# Patient Record
Sex: MALE | Race: WHITE | NOT HISPANIC OR LATINO | Employment: FULL TIME | URBAN - METROPOLITAN AREA
[De-identification: names, ages, dates, MRNs, and addresses within clinical notes are randomized per-mention and may not be internally consistent; named-entity substitution may affect disease eponyms.]

---

## 2019-02-01 ENCOUNTER — HOSPITAL ENCOUNTER (EMERGENCY)
Facility: HOSPITAL | Age: 34
Discharge: HOME | End: 2019-02-01
Attending: EMERGENCY MEDICINE
Payer: MEDICARE

## 2019-02-01 VITALS
TEMPERATURE: 98 F | HEIGHT: 70 IN | SYSTOLIC BLOOD PRESSURE: 155 MMHG | WEIGHT: 294.56 LBS | RESPIRATION RATE: 16 BRPM | DIASTOLIC BLOOD PRESSURE: 101 MMHG | BODY MASS INDEX: 42.17 KG/M2 | OXYGEN SATURATION: 96 % | HEART RATE: 73 BPM

## 2019-02-01 DIAGNOSIS — R03.0 ELEVATED BLOOD PRESSURE READING: ICD-10-CM

## 2019-02-01 DIAGNOSIS — H66.92 LEFT OTITIS MEDIA, UNSPECIFIED OTITIS MEDIA TYPE: Primary | ICD-10-CM

## 2019-02-01 DIAGNOSIS — K08.89 TOOTHACHE: ICD-10-CM

## 2019-02-01 PROCEDURE — 99284 EMERGENCY DEPT VISIT MOD MDM: CPT

## 2019-02-01 RX ORDER — IBUPROFEN 800 MG/1
800 TABLET ORAL EVERY 6 HOURS PRN
Qty: 20 TABLET | Refills: 0 | Status: SHIPPED | OUTPATIENT
Start: 2019-02-01

## 2019-02-01 RX ORDER — AMOXICILLIN AND CLAVULANATE POTASSIUM 875; 125 MG/1; MG/1
1 TABLET, FILM COATED ORAL EVERY 12 HOURS
Qty: 14 TABLET | Refills: 0 | Status: SHIPPED | OUTPATIENT
Start: 2019-02-01

## 2019-02-02 NOTE — ED PROVIDER NOTES
HISTORY     Chief Complaint   Patient presents with    Otalgia    Sore Throat    Dental Pain     Review of patient's allergies indicates:   Allergen Reactions    Inapsine [droperidol]         HPI   The history is provided by the patient.   Otalgia   This is a new problem. The current episode started today. There is pain in the left ear. The problem has been unchanged. The pain is at a severity of 7/10. Associated symptoms include sore throat. Pertinent negatives include no diarrhea, no vomiting, no cough and no rash.   Sore Throat   This is a new problem. The problem has not changed since onset.Pertinent negatives include no chest pain and no shortness of breath.   Dental Pain   The primary symptoms include mouth pain and sore throat. Primary symptoms do not include fever, shortness of breath or cough. The symptoms began several days ago. The symptoms are waxing and waning.   Additional symptoms include: ear pain.        PCP: Jose Giraldo MD     Past Medical History:  Past Medical History:   Diagnosis Date    Colon cancer     Ulcerative colitis         Past Surgical History:  Past Surgical History:   Procedure Laterality Date    ANKLE FRACTURE SURGERY      COLON SURGERY      FOOT SURGERY      GLOSSECTOMY-PARTIAL N/A 12/30/2013    Performed by [facility] at [facility]    ileal pouch      TONSILLECTOMY Bilateral 12/30/2013    Performed by [facility] at [facility]    UPPP (UVULOPALATOPHARYNGOPLASTY) Bilateral 12/30/2013    Performed by Phil Johansen MD at [facility] OR [address] [place] [address]        Family History:  No family history on file.     Social History:  Social History     Tobacco Use    Smoking status: Current Every Day Smoker     Packs/day: 0.25   Substance and Sexual Activity    Alcohol use: Yes     Comment: occasional    Drug use: Not on file    Sexual activity: Not on file         ROS   Review of Systems   Constitutional: Negative for fever.   HENT: Positive for dental  "problem, ear pain and sore throat.    Respiratory: Negative for cough and shortness of breath.    Cardiovascular: Negative for chest pain.   Gastrointestinal: Negative for diarrhea, nausea and vomiting.   Genitourinary: Negative for dysuria.   Musculoskeletal: Negative for back pain.   Skin: Negative for rash.   Neurological: Negative for weakness.   Hematological: Does not bruise/bleed easily.   All other systems reviewed and are negative.      PHYSICAL EXAM     Initial Vitals [02/01/19 2258]   BP Pulse Resp Temp SpO2   (!) 155/101 73 16 97.5 °F (36.4 °C) 96 %      MAP       --           Physical Exam    Constitutional: He appears well-developed and well-nourished. No distress.   HENT:   Head: Normocephalic and atraumatic.   Right Ear: Hearing, external ear and ear canal normal.   Left Ear: Hearing, external ear and ear canal normal. Tympanic membrane is erythematous.   Mouth/Throat: No trismus in the jaw. Abnormal dentition. Dental caries present. Posterior oropharyngeal erythema present.       Decayed back molars with mild swelling    Eyes: Conjunctivae and EOM are normal. Pupils are equal, round, and reactive to light.   Neck: Normal range of motion. Neck supple.   Cardiovascular: Normal rate and regular rhythm.   Pulmonary/Chest: Breath sounds normal. No respiratory distress. He has no wheezes. He has no rales.   Abdominal: Soft. Bowel sounds are normal.   Musculoskeletal: Normal range of motion.   Neurological: He is alert and oriented to person, place, and time.   Skin: Skin is warm and dry. Capillary refill takes less than 2 seconds. No rash noted.   Psychiatric: He has a normal mood and affect.          ED COURSE   Procedures  ED ONGOING VITALS:  Vitals:    02/01/19 2258   BP: (!) 155/101   Pulse: 73   Resp: 16   Temp: 97.5 °F (36.4 °C)   TempSrc: Oral   SpO2: 96%   Weight: 133.6 kg (294 lb 8.6 oz)   Height: 5' 10" (1.778 m)         ABNORMAL LAB VALUES:  Labs Reviewed - No data to display      ALL LAB " VALUES:        RADIOLOGY STUDIES:  Imaging Results    None                   The above vital signs and test results have been reviewed by the emergency provider.     ED Medications:  Current Discharge Medication List        Discharge Medications:  Discharge Medication List as of 2/1/2019 11:23 PM      START taking these medications    Details   amoxicillin-clavulanate 875-125mg (AUGMENTIN) 875-125 mg per tablet Take 1 tablet by mouth every 12 (twelve) hours., Starting Fri 2/1/2019, Print      ibuprofen (ADVIL,MOTRIN) 800 MG tablet Take 1 tablet (800 mg total) by mouth every 6 (six) hours as needed for Pain., Starting Fri 2/1/2019, Print              Follow-up Information     Jose Giraldo MD In 1 week.    Specialty:  Colon and Rectal Surgery  Contact information:  [address] [facility]  [address] [place] [id number] [phone]                 11:22 PM:  Discussed pt dx and plan of tx. Gave pt all f/u and return to the ED instructions. All questions and concerns were addressed at this time. Pt expresses understanding of information and instructions, and is comfortable with plan to discharge. Pt is stable for discharge.      I discussed with patient and/or family/caretaker that evaluation in the ED does not suggest any emergent or life threatening medical conditions requiring immediate intervention beyond what was provided in the ED, and I believe patient is safe for discharge. Regardless, an unremarkable evaluation in the ED does not preclude the development or presence of a serious or life threatening condition. As such, patient was instructed to return immediately for any worsening or change in current symptoms.    Pre-hypertension/Hypertension: The pt has been informed that they may have pre-hypertension or hypertension based on a blood pressure reading in the ED. I recommend that the pt call the PCP listed on their discharge instructions or a physician of their choice this week to arrange f/u for further  evaluation of possible pre-hypertension or hypertension.       MEDICAL DECISION MAKING                 CLINICAL IMPRESSION       ICD-10-CM ICD-9-CM   1. Left otitis media, unspecified otitis media type H66.92 382.9   2. Toothache K08.89 525.9   3. Elevated blood pressure reading R03.0 796.2               Syed Garcia Jr., [facility]  02/02/19 0032